# Patient Record
Sex: MALE | URBAN - METROPOLITAN AREA
[De-identification: names, ages, dates, MRNs, and addresses within clinical notes are randomized per-mention and may not be internally consistent; named-entity substitution may affect disease eponyms.]

---

## 2023-06-09 ENCOUNTER — TELEPHONE (OUTPATIENT)
Dept: ORTHOPEDIC SURGERY | Facility: CLINIC | Age: 38
End: 2023-06-09

## 2023-08-23 ENCOUNTER — OFFICE VISIT (OUTPATIENT)
Dept: SPORTS MEDICINE | Facility: CLINIC | Age: 38
End: 2023-08-23
Payer: COMMERCIAL

## 2023-08-23 VITALS
RESPIRATION RATE: 16 BRPM | DIASTOLIC BLOOD PRESSURE: 90 MMHG | HEART RATE: 80 BPM | BODY MASS INDEX: 45.1 KG/M2 | SYSTOLIC BLOOD PRESSURE: 142 MMHG | HEIGHT: 70 IN | WEIGHT: 315 LBS | OXYGEN SATURATION: 99 %

## 2023-08-23 DIAGNOSIS — G89.29 CHRONIC PAIN OF LEFT ANKLE: Primary | ICD-10-CM

## 2023-08-23 DIAGNOSIS — E66.01 MORBID OBESITY: ICD-10-CM

## 2023-08-23 DIAGNOSIS — M76.60 INSERTIONAL ACHILLES TENDINOPATHY: ICD-10-CM

## 2023-08-23 DIAGNOSIS — M25.572 CHRONIC PAIN OF LEFT ANKLE: Primary | ICD-10-CM

## 2023-08-23 RX ORDER — PANTOPRAZOLE SODIUM 40 MG/1
40 TABLET, DELAYED RELEASE ORAL DAILY
COMMUNITY
Start: 2023-08-02

## 2023-08-23 NOTE — PROGRESS NOTES
"Tahir is a 38 y.o. year old male presents to [facility]    Chief Complaint   Patient presents with    Follow-up     F/u eval for LT ankle achilles tendinopathy s/p PRP done on 07/12/2023 - reports today sxs have improved but not %100 - overall better - here for further evaluation and treatment        History of Present Illness  Follow-up left Achilles tendinopathy.  He has seen significant improvement from PRP injection.  He does not notice the pain is much day-to-day though he still notices intermittently pain such as when on uneven surfaces or when on a long car ride.  Also associates some stiffness in the morning though he is able to work this out with gentle stretches.  He was compliant with use of Cam walker for 1 week postinjection.    I have reviewed the patient's medical, family, and social history in detail and updated the computerized patient record.    /90 (BP Location: Right arm, Patient Position: Sitting, Cuff Size: Large Adult)   Pulse 80   Resp 16   Ht 177.8 cm (70\")   Wt (!) 163 kg (360 lb)   SpO2 99%   BMI 51.65 kg/mý      Physical Exam    Vital signs reviewed.   General: No acute distress.  Eyes: conjunctiva clear; pupils equally round and reactive  ENT: external ears atraumatic  CV: no peripheral edema  Resp: normal respiratory effort, no use of accessory muscles  Skin: no rashes or wounds; normal turgor  Psych: mood and affect appropriate; recent and remote memory intact  Neuro: sensation to light touch intact    MSK Exam  Normal gait                 Diagnoses and all orders for this visit:    Chronic pain of left ankle    Insertional Achilles tendinopathy    Morbid obesity    Other orders  -     pantoprazole (PROTONIX) 40 MG EC tablet; Take 1 tablet by mouth Daily.      Improving.  He continued to advance activity as tolerated.  Did discuss utility of repeat PRP injection to help with his residual pain and stiffness.  To consider 6 weeks from now " or as needed.      Follow Up   No follow-ups on file.  Patient was given instructions and counseling regarding his condition or for health maintenance advice. Please see specific information pulled into the AVS if appropriate.     EMR Dragon/Transcription disclaimer:    Much of this encounter note is an electronic transcription/translation of spoken language to printed text.  The electronic translation of spoken language may permit erroneous, or at times, nonsensical words or phrases to be inadvertently transcribed.  Although I have reviewed the note for such errors some may still exist.

## 2024-12-06 ENCOUNTER — TELEPHONE (OUTPATIENT)
Dept: CARDIOLOGY | Facility: CLINIC | Age: 39
End: 2024-12-06
Payer: COMMERCIAL

## 2024-12-11 ENCOUNTER — OFFICE VISIT (OUTPATIENT)
Dept: CARDIOLOGY | Facility: CLINIC | Age: 39
End: 2024-12-11
Payer: COMMERCIAL

## 2024-12-11 VITALS
HEART RATE: 77 BPM | BODY MASS INDEX: 45.1 KG/M2 | HEIGHT: 70 IN | SYSTOLIC BLOOD PRESSURE: 150 MMHG | WEIGHT: 315 LBS | DIASTOLIC BLOOD PRESSURE: 91 MMHG

## 2024-12-11 DIAGNOSIS — E66.01 MORBID (SEVERE) OBESITY DUE TO EXCESS CALORIES: ICD-10-CM

## 2024-12-11 DIAGNOSIS — R00.2 PALPITATIONS: Primary | ICD-10-CM

## 2024-12-11 DIAGNOSIS — I10 HYPERTENSION, ESSENTIAL: ICD-10-CM

## 2024-12-11 RX ORDER — ALBUTEROL SULFATE 90 UG/1
2 INHALANT RESPIRATORY (INHALATION) EVERY 4 HOURS PRN
COMMUNITY

## 2024-12-11 RX ORDER — POLYETHYLENE GLYCOL 3350 17 G/17G
POWDER, FOR SOLUTION ORAL
COMMUNITY
Start: 2024-09-18

## 2024-12-11 RX ORDER — IBUPROFEN 600 MG/1
600 TABLET, FILM COATED ORAL EVERY 6 HOURS PRN
COMMUNITY

## 2024-12-11 RX ORDER — SERTRALINE HYDROCHLORIDE 25 MG/1
25 TABLET, FILM COATED ORAL EVERY 24 HOURS
COMMUNITY

## 2024-12-11 NOTE — PROGRESS NOTES
Chief Complaint  PAC'S, Shortness of Breath, Palpitations, and Fatigue    Subjective            Tahir Savage presents to [facility]  Shortness of Breath  Pertinent negatives include no chest pain.   Palpitations   Associated symptoms include an irregular heartbeat and shortness of breath. Pertinent negatives include no chest pain.   Fatigue  Symptoms include fatigue.    Pertinent negative symptoms include no chest pain.       39-year-old male.  He has no previous cardiac problems other than some palpitations previously.  He recently had a left lower lobe pneumonia for which she was treated.  Since then he has had an increase in palpitations, at times daily, and at times they feel somewhat constant.  It is not described as a sustained tachycardia but an irregular heartbeat.  It seems to correlate sometimes with laying on his left side.  Over the last 72 hours it seems to have lessened somewhat.  He had EKGs done recently that showed PACs.  No syncopal episodes.  He denies chest pain.  He does have excessive shortness of breath when he is more overweight.  He is a non-smoker    PMH  History reviewed. No pertinent past medical history.      SURGICALHX  History reviewed. No pertinent surgical history.     SOC  Social History     Socioeconomic History    Marital status:    Tobacco Use    Smoking status: Never    Smokeless tobacco: Never   Vaping Use    Vaping status: Never Used   Substance and Sexual Activity    Alcohol use: Yes     Comment: OCC    Drug use: Never    Sexual activity: Defer         FAMHX  Family History   Problem Relation Age of Onset    No Known Problems Mother     Hypertension Father           ALLERGY  Allergies   Allergen Reactions    Cantaloupe (Diagnostic) Other (See Comments)     Itching, scratching, tight    Cantaloupe Extract Allergy Skin Test Other (See Comments)     Itching, scratching, tight    Banana Other (See Comments)     Severe stomach cramps     "    MEDSCURRENT    Current Outpatient Medications:     albuterol sulfate  (90 Base) MCG/ACT inhaler, Inhale 2 puffs Every 4 (Four) Hours As Needed for Wheezing., Disp: , Rfl:     ibuprofen (ADVIL,MOTRIN) 600 MG tablet, Take 1 tablet by mouth Every 6 (Six) Hours As Needed for Mild Pain., Disp: , Rfl:     pantoprazole (PROTONIX) 40 MG EC tablet, Take 1 tablet by mouth Daily., Disp: , Rfl:     polyethylene glycol (MIRALAX) 17 GM/SCOOP powder, Take one capful daily, may Increase or decrease dosing by 1/2 capful until having a soft, smooth bowel movement every 1-2 days., Disp: , Rfl:     sertraline (Zoloft) 25 MG tablet, Take 1 tablet by mouth Daily., Disp: , Rfl:       Review of Systems   Constitutional: Positive for fatigue and weight gain.   HENT: Negative.     Eyes: Negative.    Cardiovascular:  Positive for irregular heartbeat and palpitations. Negative for chest pain.   Respiratory:  Positive for shortness of breath.    Endocrine: Negative.    Hematologic/Lymphatic: Negative.    Skin: Negative.    Musculoskeletal: Negative.    Gastrointestinal: Negative.    Genitourinary: Negative.    Neurological: Negative.    Psychiatric/Behavioral: Negative.          Objective     /91   Pulse 77   Ht 177.8 cm (70\")   Wt (!) 166 kg (365 lb)   BMI 52.37 kg/m²       General Appearance:   well developed  well nourished  HENT:   oropharynx moist  lips not cyanotic  Neck:  thyroid not enlarged  supple  Respiratory:  no respiratory distress  normal breath sounds  no rales  Cardiovascular:  no jugular venous distention  regular rhythm  apical impulse normal  S1 normal, S2 normal  no S3, no S4   no murmur  no rub, no thrill  carotid pulses normal; no bruit  pedal pulses normal  lower extremity edema: none    Musculoskeletal:  no clubbing of fingers.   normocephalic, head atraumatic  Skin:   warm, dry  Psychiatric:  judgement and insight appropriate  normal mood and affect      Result Review :     The following data was " reviewed by: Azael Cope MD on 12/11/2024:      CBC          3/4/2024    12:57 9/9/2024    12:00   CBC   WBC 6.67     6.79       RBC 5.09     4.69       Hemoglobin 15.4     14       Hematocrit 46.2     42.8       MCV 90.8     91.3       MCH 30.3     29.9       MCHC 33.3     32.7       RDW 14.2     14.1       Platelets 223     197          Details          This result is from an external source.                   Data reviewed : Primary care records reviewed, recent labs reviewed, EKGs reviewed which showed sinus rhythm with PACs otherwise normal, no previous for comparison      Procedures             Assessment and Plan        ASSESSMENT:  Encounter Diagnoses   Name Primary?    Palpitations Yes    Morbid (severe) obesity due to excess calories     Hypertension, essential          PLAN:    1.  Palpitations-they seem to be likely related to PACs and have been increased in frequency since having recent pneumonia.  His baseline EKG other than PACs was otherwise normal.  He has had normal chemistries, CBC, TSH.  A 7-day Holter monitor will be done to correlate his symptoms with his rhythm  2.  Morbid obesity due to excess calories-we had a lengthy discussion today regarding a sensible approach to weight loss.  He has had success before with intermittent fasting and relative calorie restriction.  I have encouraged him to resume those efforts.  3.  Essential hypertension-elevated today.  I would first recommend a 3-month trial of lifestyle modifications and weight loss before considering medical therapy.  He is agreeable with this plan and would like to stay off medication if possible.    We will discuss the Holter monitor results when available and plan from there          Patient was given instructions and counseling regarding his condition or for health maintenance advice. Please see specific information pulled into the AVS if appropriate.             ELIAN Cope MD  12/11/2024    09:42 EST

## 2024-12-13 ENCOUNTER — PATIENT ROUNDING (BHMG ONLY) (OUTPATIENT)
Dept: CARDIOLOGY | Facility: CLINIC | Age: 39
End: 2024-12-13
Payer: COMMERCIAL

## 2024-12-20 ENCOUNTER — TELEPHONE (OUTPATIENT)
Dept: CARDIOLOGY | Facility: CLINIC | Age: 39
End: 2024-12-20

## 2024-12-20 NOTE — TELEPHONE ENCOUNTER
----- Message from ELIAN Cope sent at 12/20/2024  9:10 AM EST -----  Holter monitor showed normal rhythm with very rare ectopic beats    I discussed the results directly with the patient myself over the phone, no callback needed